# Patient Record
Sex: MALE | Race: OTHER | HISPANIC OR LATINO | Employment: UNEMPLOYED | URBAN - METROPOLITAN AREA
[De-identification: names, ages, dates, MRNs, and addresses within clinical notes are randomized per-mention and may not be internally consistent; named-entity substitution may affect disease eponyms.]

---

## 2018-04-17 ENCOUNTER — APPOINTMENT (EMERGENCY)
Dept: CT IMAGING | Facility: HOSPITAL | Age: 38
End: 2018-04-17

## 2018-04-17 ENCOUNTER — APPOINTMENT (EMERGENCY)
Dept: RADIOLOGY | Facility: HOSPITAL | Age: 38
End: 2018-04-17

## 2018-04-17 ENCOUNTER — HOSPITAL ENCOUNTER (EMERGENCY)
Facility: HOSPITAL | Age: 38
Discharge: HOME | End: 2018-04-17
Attending: EMERGENCY MEDICINE | Admitting: EMERGENCY MEDICINE

## 2018-04-17 VITALS
SYSTOLIC BLOOD PRESSURE: 121 MMHG | TEMPERATURE: 98.4 F | DIASTOLIC BLOOD PRESSURE: 78 MMHG | HEIGHT: 70 IN | WEIGHT: 220.02 LBS | BODY MASS INDEX: 31.5 KG/M2 | OXYGEN SATURATION: 95 % | HEART RATE: 78 BPM | RESPIRATION RATE: 17 BRPM

## 2018-04-17 DIAGNOSIS — K52.9 ACUTE GASTROENTERITIS: Primary | ICD-10-CM

## 2018-04-17 DIAGNOSIS — J45.909 ASTHMA: ICD-10-CM

## 2018-04-17 LAB
ALBUMIN SERPL BCP-MCNC: 3.9 G/DL (ref 3.5–5)
ALP SERPL-CCNC: 94 U/L (ref 46–116)
ALT SERPL W P-5'-P-CCNC: 89 U/L (ref 12–78)
ANION GAP SERPL CALCULATED.3IONS-SCNC: 7 MMOL/L (ref 4–13)
AST SERPL W P-5'-P-CCNC: 33 U/L (ref 5–45)
BACTERIA UR QL AUTO: ABNORMAL /HPF
BASOPHILS # BLD AUTO: 0.02 THOUSANDS/ΜL (ref 0–0.1)
BASOPHILS NFR BLD AUTO: 0 % (ref 0–1)
BILIRUB SERPL-MCNC: 1.5 MG/DL (ref 0.2–1)
BILIRUB UR QL STRIP: NEGATIVE
BUN SERPL-MCNC: 14 MG/DL (ref 5–25)
CALCIUM SERPL-MCNC: 9 MG/DL (ref 8.3–10.1)
CHLORIDE SERPL-SCNC: 103 MMOL/L (ref 100–108)
CLARITY UR: CLEAR
CO2 SERPL-SCNC: 27 MMOL/L (ref 21–32)
COLOR UR: YELLOW
CREAT SERPL-MCNC: 1.07 MG/DL (ref 0.6–1.3)
EOSINOPHIL # BLD AUTO: 0.21 THOUSAND/ΜL (ref 0–0.61)
EOSINOPHIL NFR BLD AUTO: 4 % (ref 0–6)
ERYTHROCYTE [DISTWIDTH] IN BLOOD BY AUTOMATED COUNT: 12 % (ref 11.6–15.1)
GFR SERPL CREATININE-BSD FRML MDRD: 87 ML/MIN/1.73SQ M
GLUCOSE SERPL-MCNC: 126 MG/DL (ref 65–140)
GLUCOSE UR STRIP-MCNC: NEGATIVE MG/DL
HCT VFR BLD AUTO: 48.3 % (ref 36.5–49.3)
HGB BLD-MCNC: 16 G/DL (ref 12–17)
HGB UR QL STRIP.AUTO: NEGATIVE
HYALINE CASTS #/AREA URNS LPF: ABNORMAL /LPF
KETONES UR STRIP-MCNC: NEGATIVE MG/DL
LEUKOCYTE ESTERASE UR QL STRIP: NEGATIVE
LIPASE SERPL-CCNC: 128 U/L (ref 73–393)
LYMPHOCYTES # BLD AUTO: 0.96 THOUSANDS/ΜL (ref 0.6–4.47)
LYMPHOCYTES NFR BLD AUTO: 18 % (ref 14–44)
MCH RBC QN AUTO: 29.8 PG (ref 26.8–34.3)
MCHC RBC AUTO-ENTMCNC: 33.1 G/DL (ref 31.4–37.4)
MCV RBC AUTO: 90 FL (ref 82–98)
MONOCYTES # BLD AUTO: 0.54 THOUSAND/ΜL (ref 0.17–1.22)
MONOCYTES NFR BLD AUTO: 10 % (ref 4–12)
MUCOUS THREADS UR QL AUTO: ABNORMAL
NEUTROPHILS # BLD AUTO: 3.67 THOUSANDS/ΜL (ref 1.85–7.62)
NEUTS SEG NFR BLD AUTO: 68 % (ref 43–75)
NITRITE UR QL STRIP: NEGATIVE
NON-SQ EPI CELLS URNS QL MICRO: ABNORMAL /HPF
NRBC BLD AUTO-RTO: 0 /100 WBCS
PH UR STRIP.AUTO: 7 [PH] (ref 4.5–8)
PLATELET # BLD AUTO: 176 THOUSANDS/UL (ref 149–390)
PMV BLD AUTO: 11.4 FL (ref 8.9–12.7)
POTASSIUM SERPL-SCNC: 3.7 MMOL/L (ref 3.5–5.3)
PROT SERPL-MCNC: 8.2 G/DL (ref 6.4–8.2)
PROT UR STRIP-MCNC: ABNORMAL MG/DL
RBC # BLD AUTO: 5.37 MILLION/UL (ref 3.88–5.62)
RBC #/AREA URNS AUTO: ABNORMAL /HPF
SODIUM SERPL-SCNC: 137 MMOL/L (ref 136–145)
SP GR UR STRIP.AUTO: 1.02 (ref 1–1.03)
UROBILINOGEN UR QL STRIP.AUTO: 0.2 E.U./DL
WBC # BLD AUTO: 5.43 THOUSAND/UL (ref 4.31–10.16)
WBC #/AREA URNS AUTO: ABNORMAL /HPF

## 2018-04-17 PROCEDURE — 85025 COMPLETE CBC W/AUTO DIFF WBC: CPT | Performed by: EMERGENCY MEDICINE

## 2018-04-17 PROCEDURE — 36415 COLL VENOUS BLD VENIPUNCTURE: CPT | Performed by: EMERGENCY MEDICINE

## 2018-04-17 PROCEDURE — 74177 CT ABD & PELVIS W/CONTRAST: CPT

## 2018-04-17 PROCEDURE — 80053 COMPREHEN METABOLIC PANEL: CPT | Performed by: EMERGENCY MEDICINE

## 2018-04-17 PROCEDURE — 96374 THER/PROPH/DIAG INJ IV PUSH: CPT

## 2018-04-17 PROCEDURE — 71046 X-RAY EXAM CHEST 2 VIEWS: CPT

## 2018-04-17 PROCEDURE — 83690 ASSAY OF LIPASE: CPT | Performed by: EMERGENCY MEDICINE

## 2018-04-17 PROCEDURE — 94640 AIRWAY INHALATION TREATMENT: CPT

## 2018-04-17 PROCEDURE — 99285 EMERGENCY DEPT VISIT HI MDM: CPT

## 2018-04-17 PROCEDURE — 96375 TX/PRO/DX INJ NEW DRUG ADDON: CPT

## 2018-04-17 PROCEDURE — 96361 HYDRATE IV INFUSION ADD-ON: CPT

## 2018-04-17 PROCEDURE — 81001 URINALYSIS AUTO W/SCOPE: CPT | Performed by: EMERGENCY MEDICINE

## 2018-04-17 RX ORDER — PREDNISONE 20 MG/1
TABLET ORAL
Qty: 15 TABLET | Refills: 0 | Status: SHIPPED | OUTPATIENT
Start: 2018-04-17

## 2018-04-17 RX ORDER — DICYCLOMINE HCL 20 MG
20 TABLET ORAL EVERY 6 HOURS
Qty: 30 TABLET | Refills: 0 | Status: SHIPPED | OUTPATIENT
Start: 2018-04-17

## 2018-04-17 RX ORDER — ALBUTEROL SULFATE 90 UG/1
2 AEROSOL, METERED RESPIRATORY (INHALATION) EVERY 6 HOURS PRN
Qty: 1 INHALER | Refills: 0 | Status: SHIPPED | OUTPATIENT
Start: 2018-04-17 | End: 2018-05-04

## 2018-04-17 RX ORDER — SODIUM CHLORIDE 9 MG/ML
125 INJECTION, SOLUTION INTRAVENOUS CONTINUOUS
Status: DISCONTINUED | OUTPATIENT
Start: 2018-04-17 | End: 2018-04-17 | Stop reason: HOSPADM

## 2018-04-17 RX ORDER — ONDANSETRON 2 MG/ML
4 INJECTION INTRAMUSCULAR; INTRAVENOUS ONCE
Status: COMPLETED | OUTPATIENT
Start: 2018-04-17 | End: 2018-04-17

## 2018-04-17 RX ORDER — MORPHINE SULFATE 4 MG/ML
4 INJECTION, SOLUTION INTRAMUSCULAR; INTRAVENOUS ONCE
Status: COMPLETED | OUTPATIENT
Start: 2018-04-17 | End: 2018-04-17

## 2018-04-17 RX ORDER — ALBUTEROL SULFATE 90 UG/1
2 AEROSOL, METERED RESPIRATORY (INHALATION) ONCE
Status: COMPLETED | OUTPATIENT
Start: 2018-04-17 | End: 2018-04-17

## 2018-04-17 RX ORDER — IPRATROPIUM BROMIDE AND ALBUTEROL SULFATE 2.5; .5 MG/3ML; MG/3ML
3 SOLUTION RESPIRATORY (INHALATION) ONCE
Status: COMPLETED | OUTPATIENT
Start: 2018-04-17 | End: 2018-04-17

## 2018-04-17 RX ORDER — ONDANSETRON 4 MG/1
4 TABLET, ORALLY DISINTEGRATING ORAL EVERY 8 HOURS PRN
Qty: 20 TABLET | Refills: 0 | Status: SHIPPED | OUTPATIENT
Start: 2018-04-17

## 2018-04-17 RX ORDER — MECLIZINE HYDROCHLORIDE 25 MG/1
25 TABLET ORAL ONCE
Status: COMPLETED | OUTPATIENT
Start: 2018-04-17 | End: 2018-04-17

## 2018-04-17 RX ADMIN — ALBUTEROL SULFATE 2 PUFF: 90 AEROSOL, METERED RESPIRATORY (INHALATION) at 13:28

## 2018-04-17 RX ADMIN — SODIUM CHLORIDE 1000 ML: 0.9 INJECTION, SOLUTION INTRAVENOUS at 08:00

## 2018-04-17 RX ADMIN — IOHEXOL 100 ML: 350 INJECTION, SOLUTION INTRAVENOUS at 08:50

## 2018-04-17 RX ADMIN — IPRATROPIUM BROMIDE AND ALBUTEROL SULFATE 3 ML: .5; 3 SOLUTION RESPIRATORY (INHALATION) at 08:03

## 2018-04-17 RX ADMIN — SODIUM CHLORIDE 125 ML/HR: 0.9 INJECTION, SOLUTION INTRAVENOUS at 08:00

## 2018-04-17 RX ADMIN — MECLIZINE HYDROCHLORIDE 25 MG: 25 TABLET ORAL at 08:03

## 2018-04-17 RX ADMIN — ONDANSETRON 4 MG: 2 INJECTION INTRAMUSCULAR; INTRAVENOUS at 07:48

## 2018-04-17 RX ADMIN — MORPHINE SULFATE 4 MG: 4 INJECTION, SOLUTION INTRAMUSCULAR; INTRAVENOUS at 07:48

## 2018-04-17 NOTE — ED NOTES
Discharge instructions reviewed  Patient ambulated out of department, steady gait, vss          Neymar Drake, LEE  04/17/18 4839

## 2018-04-17 NOTE — ED PROVIDER NOTES
History  Chief Complaint   Patient presents with    Abdominal Pain     LUQ abd pain since yesterday, N/V/D      26-year-old male  Presents with a 1 day history of nausea vomiting and diarrhea  The vomiting is not bilious or bloody  There is no hematochezia or melena  It is associated with left-sided abdominal pain  He has no urinary complaints  There has been subjective fever  No suspect foods or sick contacts  He was in the [facility] back in February  No recent antibiotic use  He is not an alcohol drinker  He is also complaining of several months of chest congestion and coughing  He has been wheezing and has been prescribed an inhaler  He has requested referral to pulmonary but has not gotten it yet  It is unclear if he has a known history of asthma  Patient also reports that he experiences dizziness with his vomiting  It may be spinning  No ear complaints  No headache  No other focal neurologic symptoms  Symptoms are moderately severe  No relieving factors  None       History reviewed  No pertinent past medical history  History reviewed  No pertinent surgical history  History reviewed  No pertinent family history  I have reviewed and agree with the history as documented  Social History   Substance Use Topics    Smoking status: Never Smoker    Smokeless tobacco: Never Used    Alcohol use No        Review of Systems   Constitutional: Positive for fever  Negative for chills  HENT: Positive for congestion  Negative for rhinorrhea and sore throat  Eyes: Negative for pain, redness and visual disturbance  Respiratory: Positive for cough and wheezing  Negative for shortness of breath  Cardiovascular: Negative for chest pain and leg swelling  Gastrointestinal: Positive for abdominal pain, diarrhea, nausea and vomiting  Endocrine: Negative for polydipsia and polyuria  Genitourinary: Negative for dysuria, frequency and hematuria     Musculoskeletal: Negative for back pain and neck pain  Skin: Negative for rash and wound  Allergic/Immunologic: Negative for immunocompromised state  Neurological: Positive for dizziness  Negative for weakness, numbness and headaches  Psychiatric/Behavioral: Negative for hallucinations and suicidal ideas  All other systems reviewed and are negative  Physical Exam  ED Triage Vitals   Temperature Pulse Respirations Blood Pressure SpO2   04/17/18 0757 04/17/18 0756 04/17/18 0756 04/17/18 0756 04/17/18 0756   98 4 °F (36 9 °C) 84 16 129/72 94 %      Temp Source Heart Rate Source Patient Position - Orthostatic VS BP Location FiO2 (%)   04/17/18 0757 04/17/18 0756 04/17/18 0756 04/17/18 0756 --   Oral Monitor Lying Right arm       Pain Score       04/17/18 0726       7           Orthostatic Vital Signs  Vitals:    04/17/18 0756 04/17/18 1038   BP: 129/72 122/80   Pulse: 84 93   Patient Position - Orthostatic VS: Lying        Physical Exam   Constitutional: He is oriented to person, place, and time  He appears well-developed and well-nourished  He appears distressed  HENT:   Head: Normocephalic and atraumatic  Mouth/Throat: Oropharynx is clear and moist    Eyes: Conjunctivae are normal  Right eye exhibits no discharge  Left eye exhibits no discharge  Neck: Normal range of motion  Neck supple  Cardiovascular: Normal rate, regular rhythm, normal heart sounds and intact distal pulses  Exam reveals no gallop and no friction rub  No murmur heard  Pulmonary/Chest: Effort normal  No respiratory distress  He has wheezes  He has no rales  Scattered wheezes are present   Abdominal: Soft  Bowel sounds are normal  He exhibits no distension and no mass  There is tenderness  There is no rebound and no guarding  No hernia  Musculoskeletal: Normal range of motion  He exhibits no edema, tenderness or deformity  Neurological: He is alert and oriented to person, place, and time  He has normal strength   No cranial nerve deficit or sensory deficit  Skin: Skin is warm and dry  No rash noted  Psychiatric: He has a normal mood and affect  His behavior is normal    Vitals reviewed        ED Medications  Medications   sodium chloride 0 9 % infusion (125 mL/hr Intravenous New Bag 4/17/18 0800)   albuterol (PROVENTIL HFA,VENTOLIN HFA) inhaler 2 puff (not administered)   sodium chloride 0 9 % bolus 1,000 mL (0 mL Intravenous Stopped 4/17/18 0915)   ondansetron (ZOFRAN) injection 4 mg (4 mg Intravenous Given 4/17/18 0748)   morphine (PF) 4 mg/mL injection 4 mg (4 mg Intravenous Given 4/17/18 0748)   ipratropium-albuterol (DUO-NEB) 0 5-2 5 mg/3 mL inhalation solution 3 mL (3 mL Nebulization Given 4/17/18 0803)   meclizine (ANTIVERT) tablet 25 mg (25 mg Oral Given 4/17/18 0803)   iohexol (OMNIPAQUE) 350 MG/ML injection (MULTI-DOSE) 100 mL (100 mL Intravenous Given 4/17/18 0850)       Diagnostic Studies  Results Reviewed     Procedure Component Value Units Date/Time    Urine Microscopic [[id number]]  (Abnormal) Collected:  04/17/18 0918    Lab Status:  Final result Specimen:  Urine from Urine, Clean Catch Updated:  04/17/18 0945     RBC, UA None Seen /hpf      WBC, UA None Seen /hpf      Epithelial Cells Occasional /hpf      Bacteria, UA Occasional /hpf      Hyaline Casts, UA 0-1 (A) /lpf      MUCOUS THREADS Moderate (A)    UA w Reflex to Microscopic [[id number]]  (Abnormal) Collected:  04/17/18 0918    Lab Status:  Final result Specimen:  Urine from Urine, Clean Catch Updated:  04/17/18 0929     Color, UA Yellow     Clarity, UA Clear     Specific Sullivan, UA 1 020     pH, UA 7 0     Leukocytes, UA Negative     Nitrite, UA Negative     Protein,  (2+) (A) mg/dl      Glucose, UA Negative mg/dl      Ketones, UA Negative mg/dl      Urobilinogen, UA 0 2 E U /dl      Bilirubin, UA Negative     Blood, UA Negative    Comprehensive metabolic panel [[id number]]  (Abnormal) Collected:  04/17/18 0800    Lab Status:  Final result Specimen:  Blood from Arm, Left Updated:  04/17/18 5723     Sodium 137 mmol/L      Potassium 3 7 mmol/L      Chloride 103 mmol/L      CO2 27 mmol/L      Anion Gap 7 mmol/L      BUN 14 mg/dL      Creatinine 1 07 mg/dL      Glucose 126 mg/dL      Calcium 9 0 mg/dL      AST 33 U/L      ALT 89 (H) U/L      Alkaline Phosphatase 94 U/L      Total Protein 8 2 g/dL      Albumin 3 9 g/dL      Total Bilirubin 1 50 (H) mg/dL      eGFR 87 ml/min/1 73sq m     Narrative:         National Kidney Disease Education Program recommendations are as follows:  GFR calculation is accurate only with a steady state creatinine  Chronic Kidney disease less than 60 ml/min/1 73 sq  meters  Kidney failure less than 15 ml/min/1 73 sq  meters  Lipase [[id number]]  (Normal) Collected:  04/17/18 0800    Lab Status:  Final result Specimen:  Blood from Arm, Left Updated:  04/17/18 0824     Lipase 128 u/L     CBC and differential [[id number]]  (Normal) Collected:  04/17/18 0800    Lab Status:  Final result Specimen:  Blood from Arm, Left Updated:  04/17/18 0807     WBC 5 43 Thousand/uL      RBC 5 37 Million/uL      Hemoglobin 16 0 g/dL      Hematocrit 48 3 %      MCV 90 fL      MCH 29 8 pg      MCHC 33 1 g/dL      RDW 12 0 %      MPV 11 4 fL      Platelets 527 Thousands/uL      nRBC 0 /100 WBCs      Neutrophils Relative 68 %      Lymphocytes Relative 18 %      Monocytes Relative 10 %      Eosinophils Relative 4 %      Basophils Relative 0 %      Neutrophils Absolute 3 67 Thousands/µL      Lymphocytes Absolute 0 96 Thousands/µL      Monocytes Absolute 0 54 Thousand/µL      Eosinophils Absolute 0 21 Thousand/µL      Basophils Absolute 0 02 Thousands/µL                  XR chest 2 views   ED Interpretation by Milena Arias MD (04/17 1000)   No infiltrates  No CHF  Final Result by Woody Haines DO (04/17 0901)      No acute cardiopulmonary disease              Workstation performed: [id number]         CT abdomen pelvis with contrast   Final Result by Prateek Rosales MD (04/17 2383) Mild proximal small bowel dilatation without discrete transition point identified  Fluid-filled loops of large and small bowel are present  Findings suggest an enteritis  Workstation performed: [id number]                    Procedures  Procedures       Phone Contacts  ED Phone Contact    ED Course  ED Course                                MDM  Number of Diagnoses or Management Options  Diagnosis management comments: CT scan his consistent with an enteritis  A gastroenteritis would fit the history  Patient has been experiencing nausea, vomiting and diarrhea  He does feel better after ED treatment  He does not have an acute abdomen  Patient appropriate for discharge and outpatient management  As far as the wheezing is concerned, it is relatively unchanged  However, patient is not in any respiratory distress  Oxygen saturations are normal  This appears to be chronic  I will treat the patient with albuterol, Advair and prednisone  I will refer patient to pulmonology for asthma  Chest x-ray was negative for congestive heart failure or infiltrates  Amount and/or Complexity of Data Reviewed  Clinical lab tests: ordered and reviewed  Tests in the radiology section of CPT®: ordered and reviewed  Independent visualization of images, tracings, or specimens: yes      CritCare Time    Disposition  Final diagnoses:   Acute gastroenteritis   Asthma     Time reflects when diagnosis was documented in both MDM as applicable and the Disposition within this note     Time User Action Codes Description Comment    4/17/2018  1:05 PM Han Damon [K52 9] Acute gastroenteritis     4/17/2018  1:05 PM Mohit Asp Add [U81 501] Asthma       ED Disposition     ED Disposition Condition Comment    Discharge  Roxy Davis discharge to home/self care      Condition at discharge: Good        Follow-up Information     Follow up With Specialties Details Why Markus Choe MD Pulmonology, Neurology, Pulmonary Disease, Sleep Medicine In 2 days  [address] [place] [address]  [phone]      Infolink  In 2 days Follow-up with family doctor in 2 days if not improved, sooner if any problems  Call for referral  [phone]          Patient's Medications   Discharge Prescriptions    ALBUTEROL (PROVENTIL HFA,VENTOLIN HFA) 90 MCG/ACT INHALER    Inhale 2 puffs every 6 (six) hours as needed for wheezing or shortness of breath       Start Date: 4/17/2018 End Date: --       Order Dose: 2 puffs       Quantity: 1 Inhaler    Refills: 0    DICYCLOMINE (BENTYL) 20 MG TABLET    Take 1 tablet (20 mg total) by mouth every 6 (six) hours PRN abdominal pain       Start Date: 4/17/2018 End Date: --       Order Dose: 20 mg       Quantity: 30 tablet    Refills: 0    FLUTICASONE-SALMETEROL (ADVAIR) 250-50 MCG/DOSE INHALER    Inhale 1 puff 2 (two) times a day       Start Date: 4/17/2018 End Date: --       Order Dose: 1 puff       Quantity: 1 Inhaler    Refills: 0    ONDANSETRON (ZOFRAN-ODT) 4 MG DISINTEGRATING TABLET    Take 1 tablet (4 mg total) by mouth every 8 (eight) hours as needed for nausea or vomiting       Start Date: 4/17/2018 End Date: --       Order Dose: 4 mg       Quantity: 20 tablet    Refills: 0    PREDNISONE 20 MG TABLET    60 mg p o  q day x5 days       Start Date: 4/17/2018 End Date: --       Order Dose: --       Quantity: 15 tablet    Refills: 0     No discharge procedures on file      ED Provider  Electronically Signed by           Tarik Rockwell MD  04/17/18 1808

## 2018-04-17 NOTE — DISCHARGE INSTRUCTIONS
Asma   LO QUE NECESITA SABER:   El asma es yudith enfermedad pulmonar que dificulta la respiración  La inflamación crónica y las reacciones a los desencadenantes estrechan las vías respiratorias en los pulmones  El asma puede ser de peligro mortal si no se mantiene bajo control  INSTRUCCIONES SOBRE EL [facility] HOSPITALARIA:   Regrese a la rosanne de emergencias si:   · Usted tiene falta de aliento severa  · Nano labios y [place] se ponen azules o grises  · La piel alrededor de hunt etta y costillas se hunde con cada respiración  · Usted tiene falta de [place], incluso después de whitney hunt medicamento a corto plazo según lo indicado  · Las lecturas numéricas del medidor de hunt flujo giovanna están en la rocío liat de hunt plan de acción para el asma  Pregúntele a hunt Sherol Mince vitaminas y minerales son adecuados para usted  · A usted se le acaba el medicamento antes de la fecha de hunt próximo abastecimiento  · Nano síntomas empeoran  · Usted necesita whitney más medicamento que lo acostumbrado para controlar nano síntomas  · Usted tiene preguntas o inquietudes acerca de hunt condición o cuidado  Medicamentos:   · Medicamentos,  disminuyen la inflamación, abren las vías respiratorias y facilitan hunt respiración  Los medicamentos se pueden inhalar, whitney en forma de píldora o ser inyectados  Los medicamentos a corto plazo alivian nano síntomas con [place] [employer]  Los medicamentos a laurita plazo sirven para evitar ataques en un futuro  Es posible que además necesite medicamento para ayudar a controlar las alergias  Pregúntele a hunt médico por más información sobre el medicamento que le están dando y cómo tomarlo de yudith forma Romy Merchant  · [place] nano medicamentos pat se le haya indicado  Consulte con hunt médico si usted angy que hunt medicamento no le está ayudando o si presenta efectos secundarios  Infórmele si es alérgico a algún medicamento   Mantenga yudith lista actualizada de los [place], las vitaminas y los productos herbales que mei  Incluya los siguientes datos de los medicamentos: cantidad, frecuencia y motivo de administración  Traiga con usted la lista o los envases de la píldoras a myke citas de seguimiento  Lleve la lista de los medicamentos con usted en suzanne de yudith emergencia  Acuda a myke consultas de control con hunt médico según le indicaron  Usted necesitará regresar para asegurarse que hunt medicamento está funcionando y myke síntomas están bajo control  Es posible que lo refieran a un especialista del asma  A usted podrían pedirle que [place] un registro de los valores de hunt flujo giovanna y que lo traiga a myke citas  Anote myke preguntas para que se acuerde de hacerlas kathy myke visitas  Controle myke síntomas y evite ataques futuros:   · Siga hunt plan de acción para el asma  [place] es un plan por escrito que usted y hunt médico pritchard creado  Explica cuáles medicamentos necesita usted y cuándo cambiar las dosis si fuera necesario  Además explica pat usted puede monitorear myke síntomas y usar un medidor del flujo giovanna  El medidor mide qué tan robert están funcionando los pulmones  · Controle otras afecciones de [place] , pat las [place], el reflujo estomacal y la apnea de sueño  · Identifique y evite factores desencadenantes  Estos podrían [employer], los ácaros del [place], el moho y las cucarachas  · No fume o esté alrededor de personas que fuman  La nicotina y otras sustancias químicas que contienen los cigarrillos y cigarros pueden dañar los pulmones  Pida información a hunt médico si usted actualmente fuma y necesita ayuda para dejar de fumar  Los cigarrillos electrónicos o tabaco sin humo todavía contienen nicotina  Consulte con hnut médico antes de [employer]  · Pregunte sobre la vacuna contra la gripe  La gripe puede empeorar hunt asma  Puede que usted necesite recibir yudith vacuna anual contra la gripe    © 2017 [address] Information is for End User's use only and may not be sold, redistributed or otherwise used for commercial purposes  All illustrations and images included in CareNotes® are the copyrighted property of A D A M , Inc  or [address]  Esta información es sólo para uso en educación  Hunt intención no es darle un consejo médico sobre enfermedades o tratamientos  Colsulte con hunt Ella Cables farmacéutico antes de seguir cualquier régimen médico para saber si es seguro y efectivo para usted  Gastroenteritis   LO QUE NECESITA SABER:   La gastroenteritis, o gripe estomacal, es yudith infección del estómago y los intestinos  INSTRUCCIONES SOBRE EL SHAAN HOSPITALARIA:   Llame al 911 en suzanne de presentar lo siguiente:   · Usted tiene dificultad para respirar o pulso acelerado  Regrese a la rosanne de emergencias si:   · Usted ve den en hunt diarrea  · Usted no puede dejar de vomitar  · Usted no ha orinado en 12 horas  · Usted siente que se va a desmayar  Pregúntele a hunt Marvie Backers vitaminas y minerales son adecuados para usted  · Usted tiene fiebre  · Usted continúa con vómitos o diarrea aún después del tratamiento  · Usted ve lombrices en hunt diarrea  · Hunt boca u ojos están secos  Usted no está orinando tanto o con la misma frecuencia  · Usted tiene preguntas o inquietudes acerca de hunt condición o cuidado  Medicamentos:   · Medicamentos,  se pueden administrar para [employer] vómitos o la diarrea, disminuir los calambres abdominales o tratar yudith infección  · [place] myke medicamentos pat se le haya indicado  Consulte con hunt médico si usted angy que hunt medicamento no le está ayudando o si presenta efectos secundarios  Infórmele si es alérgico a cualquier medicamento  Mantenga yudith lista actualizada de los [place], las vitaminas y los productos herbales que emi  Incluya los siguientes datos de los medicamentos: cantidad, frecuencia y motivo de administración   [place] con usted la lista o los envases de la píldoras a myke citas de seguimiento  Lleve la lista de los medicamentos con usted en suzanne de bianka emergencia  [place] de hunt síntomas:   · [place] líquidos pat se le haya indicado  Pregunte a hunt médico qué cantidad de líquido debe beber a diario y qué líquidos le recomienda  Es posible que también necesite whitney bianka solución de rehidratación oral (SRO)  Bianka SRO contiene la cantidad [place] de azúcar, sal y minerales en agua para reponer los líquidos corporales  · Consuma alimentos blandos  Cuando usted sienta [place], empiece a comer alimentos suaves y blandos  Ejemplos son los plátanos, sopas claras, dago y puré de [place]  No consuma productos lácteos, alcohol, bebidas azucaradas, o bebidas con cafeína hasta que se sienta mejor  · Descanse el mayor tiempo posible  Cuando se empiece a sentir mejor, comience poco a poco a hacer más cada día  Evite la propagación de la gastroenteritis:  La gastroenteritis se puede propagar fácilmente  Manténgase usted, hunt ronnie y myke alrededores limpios para ayudar a evitar la propagación de la gastroenteritis:  · Lávese las rl frecuentemente  Utilice agua y [place]  [employer] las rl después de usar el baño, cambiarle el pañal a un miller o estornudar  Lávese las rl antes de comer o preparar alimentos  · Limpie las superficies y lave la ropa con frecuencia  Lave hunt ropa y myke toallas por separado del january de la ropa  Limpie las superficies de hunt hogar con limpiador antibacterial o con blanqueador  · Lave y cocine robert los alimentos  Lave las verduras crudas antes de cocinar  [address] y [place]  No utilice los mismos platos para las temi crudas que para otros alimentos  Ponga en el refrigerador inmediatamente cualquier alimento que haya sobrado  · Esté alerta cuando usted vaya de campamento o cuando viaje  Solamente tome agua limpia  No tome agua de los soumya o dominik a menos que usted purifique o hierva el agua larry   Cuando viaje, tome agua embotellada y no le ponga hielo  No coma fruta con la cáscara  No coma pescado crudo o temi que no están cocinadas completamente  Acuda a myke consultas de control con ronquillo médico según le indicaron  Anote myke preguntas para que se acuerde de hacerlas kathy myke visitas  © 2017 [address] Information is for End User's use only and may not be sold, redistributed or otherwise used for commercial purposes  All illustrations and images included in CareNotes® are the copyrighted property of A D A M , Inc  or Brian Gordillo  Esta información es sólo para uso en educación  Ronquillo intención no es darle un consejo médico sobre enfermedades o tratamientos  Colsulte con ronquillo Ric Tim farmacéutico antes de seguir cualquier régimen médico para saber si es seguro y efectivo para usted

## 2018-05-03 ENCOUNTER — HOSPITAL ENCOUNTER (EMERGENCY)
Facility: HOSPITAL | Age: 38
Discharge: HOME | End: 2018-05-04
Attending: EMERGENCY MEDICINE | Admitting: EMERGENCY MEDICINE

## 2018-05-03 ENCOUNTER — APPOINTMENT (EMERGENCY)
Dept: RADIOLOGY | Facility: HOSPITAL | Age: 38
End: 2018-05-03

## 2018-05-03 DIAGNOSIS — R06.2 WHEEZING: Primary | ICD-10-CM

## 2018-05-03 DIAGNOSIS — J45.909 ASTHMA: ICD-10-CM

## 2018-05-03 DIAGNOSIS — J45.901 ASTHMA EXACERBATION: ICD-10-CM

## 2018-05-03 LAB
ALBUMIN SERPL BCP-MCNC: 3.7 G/DL (ref 3.5–5)
ALP SERPL-CCNC: 82 U/L (ref 46–116)
ALT SERPL W P-5'-P-CCNC: 68 U/L (ref 12–78)
ANION GAP SERPL CALCULATED.3IONS-SCNC: 9 MMOL/L (ref 4–13)
AST SERPL W P-5'-P-CCNC: 29 U/L (ref 5–45)
BASOPHILS # BLD AUTO: 0.06 THOUSANDS/ΜL (ref 0–0.1)
BASOPHILS NFR BLD AUTO: 1 % (ref 0–1)
BILIRUB SERPL-MCNC: 0.9 MG/DL (ref 0.2–1)
BUN SERPL-MCNC: 14 MG/DL (ref 5–25)
CALCIUM SERPL-MCNC: 9.2 MG/DL (ref 8.3–10.1)
CHLORIDE SERPL-SCNC: 102 MMOL/L (ref 100–108)
CO2 SERPL-SCNC: 25 MMOL/L (ref 21–32)
CREAT SERPL-MCNC: 1.19 MG/DL (ref 0.6–1.3)
EOSINOPHIL # BLD AUTO: 0.28 THOUSAND/ΜL (ref 0–0.61)
EOSINOPHIL NFR BLD AUTO: 4 % (ref 0–6)
ERYTHROCYTE [DISTWIDTH] IN BLOOD BY AUTOMATED COUNT: 11.9 % (ref 11.6–15.1)
GFR SERPL CREATININE-BSD FRML MDRD: 76 ML/MIN/1.73SQ M
GLUCOSE SERPL-MCNC: 95 MG/DL (ref 65–140)
HCT VFR BLD AUTO: 46.6 % (ref 36.5–49.3)
HGB BLD-MCNC: 15.6 G/DL (ref 12–17)
LYMPHOCYTES # BLD AUTO: 2.83 THOUSANDS/ΜL (ref 0.6–4.47)
LYMPHOCYTES NFR BLD AUTO: 39 % (ref 14–44)
MCH RBC QN AUTO: 29.8 PG (ref 26.8–34.3)
MCHC RBC AUTO-ENTMCNC: 33.5 G/DL (ref 31.4–37.4)
MCV RBC AUTO: 89 FL (ref 82–98)
MONOCYTES # BLD AUTO: 0.64 THOUSAND/ΜL (ref 0.17–1.22)
MONOCYTES NFR BLD AUTO: 9 % (ref 4–12)
NEUTROPHILS # BLD AUTO: 3.39 THOUSANDS/ΜL (ref 1.85–7.62)
NEUTS SEG NFR BLD AUTO: 47 % (ref 43–75)
NRBC BLD AUTO-RTO: 0 /100 WBCS
PLATELET # BLD AUTO: 182 THOUSANDS/UL (ref 149–390)
PMV BLD AUTO: 11.2 FL (ref 8.9–12.7)
POTASSIUM SERPL-SCNC: 3.6 MMOL/L (ref 3.5–5.3)
PROT SERPL-MCNC: 7.6 G/DL (ref 6.4–8.2)
RBC # BLD AUTO: 5.23 MILLION/UL (ref 3.88–5.62)
SODIUM SERPL-SCNC: 136 MMOL/L (ref 136–145)
TROPONIN I SERPL-MCNC: <0.02 NG/ML
WBC # BLD AUTO: 7.22 THOUSAND/UL (ref 4.31–10.16)

## 2018-05-03 PROCEDURE — 93005 ELECTROCARDIOGRAM TRACING: CPT

## 2018-05-03 PROCEDURE — 80053 COMPREHEN METABOLIC PANEL: CPT | Performed by: EMERGENCY MEDICINE

## 2018-05-03 PROCEDURE — 84484 ASSAY OF TROPONIN QUANT: CPT | Performed by: EMERGENCY MEDICINE

## 2018-05-03 PROCEDURE — 71046 X-RAY EXAM CHEST 2 VIEWS: CPT

## 2018-05-03 PROCEDURE — 36415 COLL VENOUS BLD VENIPUNCTURE: CPT

## 2018-05-03 PROCEDURE — 85025 COMPLETE CBC W/AUTO DIFF WBC: CPT | Performed by: EMERGENCY MEDICINE

## 2018-05-03 RX ORDER — ALBUTEROL SULFATE 2.5 MG/3ML
5 SOLUTION RESPIRATORY (INHALATION) ONCE
Status: COMPLETED | OUTPATIENT
Start: 2018-05-03 | End: 2018-05-03

## 2018-05-03 RX ADMIN — ALBUTEROL SULFATE 5 MG: 2.5 SOLUTION RESPIRATORY (INHALATION) at 22:36

## 2018-05-03 RX ADMIN — IPRATROPIUM BROMIDE 0.5 MG: 0.5 SOLUTION RESPIRATORY (INHALATION) at 22:36

## 2018-05-04 VITALS
OXYGEN SATURATION: 96 % | SYSTOLIC BLOOD PRESSURE: 146 MMHG | RESPIRATION RATE: 22 BRPM | WEIGHT: 219 LBS | TEMPERATURE: 98 F | DIASTOLIC BLOOD PRESSURE: 66 MMHG | BODY MASS INDEX: 31.42 KG/M2 | HEART RATE: 87 BPM

## 2018-05-04 LAB
ATRIAL RATE: 82 BPM
ATRIAL RATE: 84 BPM
P AXIS: 65 DEGREES
P AXIS: 72 DEGREES
PR INTERVAL: 158 MS
PR INTERVAL: 168 MS
QRS AXIS: 75 DEGREES
QRS AXIS: 78 DEGREES
QRSD INTERVAL: 74 MS
QRSD INTERVAL: 76 MS
QT INTERVAL: 344 MS
QT INTERVAL: 350 MS
QTC INTERVAL: 401 MS
QTC INTERVAL: 413 MS
T WAVE AXIS: 60 DEGREES
T WAVE AXIS: 62 DEGREES
VENTRICULAR RATE: 82 BPM
VENTRICULAR RATE: 84 BPM

## 2018-05-04 PROCEDURE — 96374 THER/PROPH/DIAG INJ IV PUSH: CPT

## 2018-05-04 PROCEDURE — 99285 EMERGENCY DEPT VISIT HI MDM: CPT

## 2018-05-04 PROCEDURE — 94640 AIRWAY INHALATION TREATMENT: CPT

## 2018-05-04 PROCEDURE — 93010 ELECTROCARDIOGRAM REPORT: CPT | Performed by: INTERNAL MEDICINE

## 2018-05-04 RX ORDER — KETOROLAC TROMETHAMINE 30 MG/ML
15 INJECTION, SOLUTION INTRAMUSCULAR; INTRAVENOUS ONCE
Status: COMPLETED | OUTPATIENT
Start: 2018-05-04 | End: 2018-05-04

## 2018-05-04 RX ORDER — PREDNISONE 20 MG/1
60 TABLET ORAL ONCE
Status: COMPLETED | OUTPATIENT
Start: 2018-05-04 | End: 2018-05-04

## 2018-05-04 RX ORDER — ALBUTEROL SULFATE 90 UG/1
2 AEROSOL, METERED RESPIRATORY (INHALATION) EVERY 4 HOURS PRN
Qty: 1 INHALER | Refills: 0 | Status: SHIPPED | OUTPATIENT
Start: 2018-05-04

## 2018-05-04 RX ORDER — ALBUTEROL SULFATE 2.5 MG/3ML
2.5 SOLUTION RESPIRATORY (INHALATION) ONCE
Status: COMPLETED | OUTPATIENT
Start: 2018-05-04 | End: 2018-05-04

## 2018-05-04 RX ORDER — PREDNISONE 50 MG/1
50 TABLET ORAL DAILY
Qty: 4 TABLET | Refills: 0 | Status: SHIPPED | OUTPATIENT
Start: 2018-05-04 | End: 2018-05-08

## 2018-05-04 RX ADMIN — KETOROLAC TROMETHAMINE 15 MG: 30 INJECTION, SOLUTION INTRAMUSCULAR at 00:14

## 2018-05-04 RX ADMIN — IPRATROPIUM BROMIDE 0.5 MG: 0.5 SOLUTION RESPIRATORY (INHALATION) at 00:11

## 2018-05-04 RX ADMIN — ALBUTEROL SULFATE 2.5 MG: 2.5 SOLUTION RESPIRATORY (INHALATION) at 00:11

## 2018-05-04 RX ADMIN — PREDNISONE 60 MG: 20 TABLET ORAL at 00:11

## 2018-05-04 NOTE — DISCHARGE INSTRUCTIONS
Use the inhaler as needed for coughing spells or trouble breathing  You need to follow up with a primary care doctor for long-term management of your symptoms  Take ibuprofen (Motrin, Advil) or acetaminophen (Tylenol) as needed for pain, as per the instructions  Asma, cuidados ambulatorios   INFORMACIÓN GENERAL:   El asma  es yudith enfermedad pulmonar que dificulta la respiración  La inflamación crónica y las reacciones a los factores desencadenantes estrechan las vías respiratorias en myke pulmones  El asma puede ser mortal si no se controla  Los síntomas comunes incluyen los siguientes:   · Tos     · Sibilancias     · Falta de aliento     · Opresión en el pecho  Busque cuidados inmediatos para los siguientes síntomas:   · Falta de aliento severa    · Labios o uñas azules o grises    · La piel alrededor de hunt etta y costillas se hunde con cada respiro    · Falta de [place], aún después de tomarse myke medicamentos de uso a corto plazo según myke indicaciones    · Las lecturas numéricas del medidor de hunt flujo giovanna están en la rocío liat de hunt plan de acción para el asma  El tratamiento para el asma  dependerá de hunt severidad  Es posible que los medicamentos disminuyan la inflamación, ivelisse las vías respiratorias y faciliten hunt respiración  Los medicamentos pueden inhalarse, tomarse en pastilla o [place]  Los [place] de uso a corto plazo alivian myke síntomas rápidamente  Los medicamentos a laurita plazo se usan para prevenir ataques futuros  Puede ser que usted también necesite medicamentos para ayudar a controlar myke alergias  Bevely Feeling y prevenga futuros ataques de asma:   · Siga hunt plan de acción para el asma  [place] es un plan escrito que usted y hunt médico Lety Dom  [place] explica cual medicamento usted necesita y si es necesario, y cuando cambiar la dosis  También explica cómo controlar los síntomas y utilizar un medidor de flujo giovanna (alto)   El medidor mide qué tan robert están funcionando los pulmones  · [address] , pat las [employer], el reflujo estomacal y la apnea de sueño  · Identifique y evite los factores desencadenantes  Estos pueden [employer], los ácaros del [place], el moho y las cucarachas  · No fume y evite a los fumadores  Si usted fuma, nunca es tarde para dejar de hacerlo  Consulte con hunt médico si necesita ayuda para dejar de fumar  · Consulte sobre la vacuna contra la gripe  La gripe puede empeorar hunt asma  Es posible que necesite de yudith vacuna contra la gripe cada año  Programe yudith lupe de seguimiento con hunt proveedor de angela según indicaciones:  Usted tendrá que regresar para asegurarse de que hunt medicamento esté funcionando y myke síntomas están controlados  Es probable que a usted lo refieran a un especialista en asma o alergias  Seguramente le pedirán que anote los valores numéricos de hunt medidor del flujo giovanna para que los lleve a myke citas de seguimiento  Anote myke preguntas para que las recuerde kathy myke citas de seguimiento  ACUERDOS SOBRE HUNT CUIDADO:   Usted tiene el derecho de participar en la planificación de hunt cuidado  Aprenda todo lo que pueda sobre hunt condición y pat darle tratamiento  Discuta con myke médicos myke opciones de tratamiento para juntos decidir el cuidado que usted quiere recibir  Usted siempre tiene el derecho a rechazar hunt tratamiento  Esta información es sólo para uso en educación  Hunt intención no es darle un consejo médico sobre enfermedades o tratamientos  Colsulte con hunt Star Darline farmacéutico antes de seguir cualquier régimen médico para saber si es seguro y efectivo para usted  © 2014 [address] is for End User's use only and may not be sold, redistributed or otherwise used for commercial purposes  All illustrations and images included in CareNotes® are the copyrighted property of A D A M , Inc  or [facility]

## 2018-05-04 NOTE — ED PROVIDER NOTES
History  Chief Complaint   Patient presents with    Shortness of Breath     pt states he started with sob yesterday afternoon , pain in chest when coughs and feels dizzy as well as pain in his head      HPI    Prior to Admission Medications   Prescriptions Last Dose Informant Patient Reported? Taking? albuterol (PROVENTIL HFA,VENTOLIN HFA) 90 mcg/act inhaler   No No   Sig: Inhale 2 puffs every 6 (six) hours as needed for wheezing or shortness of breath   dicyclomine (BENTYL) 20 mg tablet   No No   Sig: Take 1 tablet (20 mg total) by mouth every 6 (six) hours PRN abdominal pain   fluticasone-salmeterol (ADVAIR) 250-50 mcg/dose inhaler   No No   Sig: Inhale 1 puff 2 (two) times a day   ondansetron (ZOFRAN-ODT) 4 mg disintegrating tablet   No No   Sig: Take 1 tablet (4 mg total) by mouth every 8 (eight) hours as needed for nausea or vomiting   predniSONE 20 mg tablet   No No   Si mg p o  q day x5 days      Facility-Administered Medications: None       History reviewed  No pertinent past medical history  History reviewed  No pertinent surgical history  History reviewed  No pertinent family history  I have reviewed and agree with the history as documented  Social History   Substance Use Topics    Smoking status: Never Smoker    Smokeless tobacco: Never Used    Alcohol use No        Review of Systems    Physical Exam  ED Triage Vitals [18]   Temperature Pulse Respirations Blood Pressure SpO2   98 °F (36 7 °C) 88 20 132/59 93 %      Temp Source Heart Rate Source Patient Position - Orthostatic VS BP Location FiO2 (%)   Oral Monitor Sitting Left arm --      Pain Score       --           Orthostatic Vital Signs  Vitals:    18 2144 18 2316   BP: 132/59 121/78   Pulse: 88 99   Patient Position - Orthostatic VS: Sitting Lying       Physical Exam   Constitutional: He is oriented to person, place, and time  He appears well-developed and well-nourished  No distress     HENT:   Head: Normocephalic and atraumatic  Mouth/Throat: Oropharynx is clear and moist    Eyes: Conjunctivae are normal  Pupils are equal, round, and reactive to light  Neck: Normal range of motion  No tracheal deviation present  Cardiovascular: Normal rate, regular rhythm, normal heart sounds and intact distal pulses  Pulmonary/Chest: Effort normal  No respiratory distress  He has no decreased breath sounds  He has wheezes (mild, diffuse )  No coughing while I am with the patient   Abdominal: Soft  He exhibits no distension  There is no tenderness  Neurological: He is alert and oriented to person, place, and time  GCS eye subscore is 4  GCS verbal subscore is 5  GCS motor subscore is 6  Skin: Skin is warm and dry  Psychiatric: He has a normal mood and affect  His behavior is normal    Nursing note and vitals reviewed  ED Medications  Medications   albuterol inhalation solution 2 5 mg (not administered)   ipratropium (ATROVENT) 0 02 % inhalation solution 0 5 mg (not administered)   predniSONE tablet 60 mg (not administered)   ketorolac (TORADOL) injection 15 mg (not administered)   albuterol inhalation solution 5 mg (5 mg Nebulization Given 5/3/18 2236)   ipratropium (ATROVENT) 0 02 % inhalation solution 0 5 mg (0 5 mg Nebulization Given 5/3/18 2236)       Diagnostic Studies  Results Reviewed     Procedure Component Value Units Date/Time    Troponin I [[id number]]  (Normal) Collected:  05/03/18 2233    Lab Status:  Final result Specimen:  Blood from Arm, Right Updated:  05/03/18 2257     Troponin I <0 02 ng/mL     Narrative:         [employer] Chemistry analyzer 99% cutoff is > 0 04 ng/mL in network labs    o cTnI 99% cutoff is useful only when applied to patients in the clinical setting of myocardial ischemia  o cTnI 99% cutoff should be interpreted in the context of clinical history, ECG findings and possibly cardiac imaging to establish correct diagnosis    o cTnI 99% cutoff may be suggestive but clearly not indicative of a coronary event without the clinical setting of myocardial ischemia  Comprehensive metabolic panel [[id number]] Collected:  05/03/18 2233    Lab Status:  Final result Specimen:  Blood from Arm, Right Updated:  05/03/18 2254     Sodium 136 mmol/L      Potassium 3 6 mmol/L      Chloride 102 mmol/L      CO2 25 mmol/L      Anion Gap 9 mmol/L      BUN 14 mg/dL      Creatinine 1 19 mg/dL      Glucose 95 mg/dL      Calcium 9 2 mg/dL      AST 29 U/L      ALT 68 U/L      Alkaline Phosphatase 82 U/L      Total Protein 7 6 g/dL      Albumin 3 7 g/dL      Total Bilirubin 0 90 mg/dL      eGFR 76 ml/min/1 73sq m     Narrative:         National Kidney Disease Education Program recommendations are as follows:  GFR calculation is accurate only with a steady state creatinine  Chronic Kidney disease less than 60 ml/min/1 73 sq  meters  Kidney failure less than 15 ml/min/1 73 sq  meters      CBC and differential [[id number]] Collected:  05/03/18 2233    Lab Status:  Final result Specimen:  Blood from Arm, Right Updated:  05/03/18 2238     WBC 7 22 Thousand/uL      RBC 5 23 Million/uL      Hemoglobin 15 6 g/dL      Hematocrit 46 6 %      MCV 89 fL      MCH 29 8 pg      MCHC 33 5 g/dL      RDW 11 9 %      MPV 11 2 fL      Platelets 572 Thousands/uL      nRBC 0 /100 WBCs      Neutrophils Relative 47 %      Lymphocytes Relative 39 %      Monocytes Relative 9 %      Eosinophils Relative 4 %      Basophils Relative 1 %      Neutrophils Absolute 3 39 Thousands/µL      Lymphocytes Absolute 2 83 Thousands/µL      Monocytes Absolute 0 64 Thousand/µL      Eosinophils Absolute 0 28 Thousand/µL      Basophils Absolute 0 06 Thousands/µL                  X-ray chest 2 views    (Results Pending)              Procedures  ECG 12 Lead Documentation  Date/Time: 5/4/2018 12:09 AM  Performed by: Georgie Saul  Authorized by: Georgie Saul     Indications / Diagnosis:  Cough, SOB, CP  ECG reviewed by me, the ED Provider: yes    Patient location:  ED  Previous ECG:     Previous ECG:  Unavailable  Interpretation:     Interpretation: non-specific    Rate:     ECG rate:  82    ECG rate assessment: normal    Rhythm:     Rhythm: sinus rhythm    Ectopy:     Ectopy: none    QRS:     QRS axis:  Normal    QRS intervals:  Normal  Conduction:     Conduction: normal    ST segments:     ST segments:  Abnormal    Elevation:  II, III, aVF, V2, V3, V4 and V5  T waves:     T waves: normal    Comments:      VINICIUS more likely early repolarization than acute pericarditis           Phone Contacts  ED Phone Contact    ED Course                               MDM  Number of Diagnoses or Management Options  Asthma exacerbation: new and requires workup  Wheezing: new and requires workup  Diagnosis management comments: This is a 55-year-old male who presents here today with shortness of breath, cough, chest congestion   services were provided through the "[employer] phone  He states this has been going on for a while "  He was seen here in the hospital about two weeks ago for the same symptoms and was told he might have asthma  He states that today he felt like he could not breathe  His cough is intermittently productive of white sputum  He denies any recent worsening or changing of his cough  He states that he will sometimes have a headache in feel lightheaded while coughing  He endorses chronic back pain that he has been seen for a while ago that is not acutely worse or different  He does endorse some internal chest pain which he describes as feeling like he is itching on the inside  He denies any relation to breathing, movement, positions, exertions  He denies any trauma to his chest   He denies any known history of asthma or COPD, denies any known family history of the same, and is a nonsmoker  He has been using the albuterol that he was given two weeks ago and states that it helps his symptoms for a short period of time  Today he felt like he could not catch his breath, so came to the emergency department for evaluation  He has not followed up with a primary care doctor for his symptoms  He denies any nasal congestion or other URI symptoms  He has no other complaints  By the time the patient was seen by myself, he had already received a nebulizer, and states his symptoms of coughing and shortness of breath have resolved completely  ROS: Otherwise negative, unless stated as above  He is well-appearing, in no acute distress  He does have mild residual diffuse expiratory wheezing  There are no signs of respiratory distress  He is not coughing while I am in the room with him  The remainder of his exam is unremarkable  This is most likely undiagnosed COPD or asthma causing symptoms  He needs to follow up with the primary care doctor and possibly a pulmonologist for further evaluation and long-term management of this  Though he states his symptoms have improved, we will give him an additional treatment prior to discharge to resolve the last of his wheezing, and give him a short course of steroids  Blood work and was a chest x-ray was obtained by nursing prior to the patient being seen by myself  Based on his chronic symptoms and clinical presentation consistent with chronic lung disease that is improperly treated at this time do not feel that this is necessary  His chest x-ray was reviewed by myself and shows no acute abnormalities, and his blood work is unremarkable  I do not feel that he needs any additional workup at this point in time         Amount and/or Complexity of Data Reviewed  Clinical lab tests: reviewed and ordered  Tests in the radiology section of CPT®: reviewed and ordered  Independent visualization of images, tracings, or specimens: yes      CritCare Time    Disposition  Final diagnoses:   Wheezing   Asthma exacerbation - presumed     Time reflects when diagnosis was documented in both MDM as applicable and the Disposition within this note     Time User Action Codes Description Comment    5/4/2018 12:05 AM Ayan Rodriguez Add [R06 2] Wheezing     5/4/2018 12:06 AM Ayan Rodriguez Add [H76 846] Asthma exacerbation     5/4/2018 12:06 AM Ayan Rodriguez Modify [J45 901] Asthma exacerbation presumed      ED Disposition     None      Follow-up Information     Follow up With Specialties Details Why Osvaldo Treadwell MD Internal Medicine Schedule an appointment as soon as possible for a visit to set up care with a primary care doctor [address] [facility] [address] [facility]      Tarsha Winston MD Family Medicine Schedule an appointment as soon as possible for a visit to set up care with a primary care doctor [address] [facility] [address]      Joanna Galvez MD Internal Medicine Schedule an appointment as soon as possible for a visit to set up care with a primary care doctor [facility]  [phone]          Patient's Medications   Discharge Prescriptions    PREDNISONE 50 MG TABLET    Take 1 tablet (50 mg total) by mouth daily for 4 days       Start Date: 5/4/2018  End Date: 5/8/2018       Order Dose: 50 mg       Quantity: 4 tablet    Refills: 0     No discharge procedures on file      ED Provider  Electronically Signed by           Rafita Mclain MD  05/04/18 9275

## 2019-01-21 ENCOUNTER — NEW PATIENT (OUTPATIENT)
Dept: URBAN - METROPOLITAN AREA CLINIC 87 | Facility: CLINIC | Age: 39
End: 2019-01-21

## 2019-01-21 VITALS — SYSTOLIC BLOOD PRESSURE: 148 MMHG | DIASTOLIC BLOOD PRESSURE: 61 MMHG | HEIGHT: 60 IN

## 2019-01-21 DIAGNOSIS — E11.3513: ICD-10-CM

## 2019-01-21 DIAGNOSIS — H25.9: ICD-10-CM

## 2019-01-21 DIAGNOSIS — H43.13: ICD-10-CM

## 2019-01-21 PROCEDURE — 4040F PNEUMOC VAC/ADMIN/RCVD: CPT | Mod: 8P

## 2019-01-21 PROCEDURE — G8427 DOCREV CUR MEDS BY ELIG CLIN: HCPCS

## 2019-01-21 PROCEDURE — G8482 FLU IMMUNIZE ORDER/ADMIN: HCPCS

## 2019-01-21 PROCEDURE — 1036F TOBACCO NON-USER: CPT

## 2019-01-21 PROCEDURE — 2022F DILAT RTA XM EVC RTNOPTHY: CPT

## 2019-01-21 PROCEDURE — 99203 OFFICE O/P NEW LOW 30 MIN: CPT

## 2019-01-21 PROCEDURE — G9903 PT SCRN TBCO ID AS NON USER: HCPCS

## 2019-01-21 PROCEDURE — 5010F MACUL RESULT PHY/QHP MNG DM: CPT

## 2019-01-21 PROCEDURE — 92225 OPHTHALMOSCOPY (INITIAL): CPT

## 2019-01-21 PROCEDURE — 92134 CPTRZ OPH DX IMG PST SGM RTA: CPT

## 2019-01-21 PROCEDURE — 2021F DILAT MACULAR EXAM DONE: CPT

## 2019-01-21 PROCEDURE — G8397 DIL MACULA/FUNDUS EXAM/W DOC: HCPCS

## 2019-01-21 ASSESSMENT — VISUAL ACUITY
OS_CC: 20/800
OS_SC: 20/200-1
OD_CC: 20/100
OD_SC: 20/70-1

## 2019-01-21 ASSESSMENT — TONOMETRY
OD_IOP_MMHG: 20
OS_IOP_MMHG: 15

## 2021-01-15 DIAGNOSIS — K29.90 GASTRITIS AND DUODENITIS: Primary | ICD-10-CM

## 2021-01-15 RX ORDER — SUCRALFATE 1 G/1
1 TABLET ORAL 4 TIMES DAILY
Qty: 270 TABLET | Refills: 0 | Status: SHIPPED | OUTPATIENT
Start: 2021-01-15